# Patient Record
Sex: FEMALE | URBAN - METROPOLITAN AREA
[De-identification: names, ages, dates, MRNs, and addresses within clinical notes are randomized per-mention and may not be internally consistent; named-entity substitution may affect disease eponyms.]

---

## 2023-08-03 ENCOUNTER — APPOINTMENT (OUTPATIENT)
Dept: URGENT CARE | Age: 53
End: 2023-08-03

## 2024-12-22 ENCOUNTER — APPOINTMENT (OUTPATIENT)
Dept: CT IMAGING | Facility: HOSPITAL | Age: 54
End: 2024-12-22
Attending: EMERGENCY MEDICINE

## 2024-12-22 ENCOUNTER — HOSPITAL ENCOUNTER (EMERGENCY)
Facility: HOSPITAL | Age: 54
Discharge: HOME | End: 2024-12-22
Attending: EMERGENCY MEDICINE

## 2024-12-22 VITALS
SYSTOLIC BLOOD PRESSURE: 143 MMHG | WEIGHT: 208 LBS | TEMPERATURE: 99 F | OXYGEN SATURATION: 94 % | HEART RATE: 97 BPM | BODY MASS INDEX: 33.43 KG/M2 | HEIGHT: 66 IN | DIASTOLIC BLOOD PRESSURE: 98 MMHG | RESPIRATION RATE: 20 BRPM

## 2024-12-22 DIAGNOSIS — R03.0 ELEVATED BLOOD PRESSURE READING: ICD-10-CM

## 2024-12-22 DIAGNOSIS — K04.7 DENTAL ABSCESS: ICD-10-CM

## 2024-12-22 DIAGNOSIS — L03.211 CELLULITIS OF FACE: Primary | ICD-10-CM

## 2024-12-22 LAB
ANION GAP SERPL CALC-SCNC: 7 MMOL/L (ref 0–18)
BASOPHILS # BLD AUTO: 0.03 X10(3) UL (ref 0–0.2)
BASOPHILS NFR BLD AUTO: 0.2 %
BUN BLD-MCNC: 8 MG/DL (ref 9–23)
CALCIUM BLD-MCNC: 10.3 MG/DL (ref 8.7–10.4)
CHLORIDE SERPL-SCNC: 109 MMOL/L (ref 98–112)
CO2 SERPL-SCNC: 24 MMOL/L (ref 21–32)
CREAT BLD-MCNC: 0.64 MG/DL
DEPRECATED RDW RBC AUTO: 40.3 FL (ref 35.1–46.3)
EGFRCR SERPLBLD CKD-EPI 2021: 106 ML/MIN/1.73M2 (ref 60–?)
EOSINOPHIL # BLD AUTO: 0.14 X10(3) UL (ref 0–0.7)
EOSINOPHIL NFR BLD AUTO: 0.9 %
ERYTHROCYTE [DISTWIDTH] IN BLOOD BY AUTOMATED COUNT: 13.1 % (ref 11–15)
GLUCOSE BLD-MCNC: 120 MG/DL (ref 70–99)
HCT VFR BLD AUTO: 38.5 %
HGB BLD-MCNC: 13.3 G/DL
IMM GRANULOCYTES # BLD AUTO: 0.05 X10(3) UL (ref 0–1)
IMM GRANULOCYTES NFR BLD: 0.3 %
LACTATE SERPL-SCNC: 0.8 MMOL/L (ref 0.5–2)
LYMPHOCYTES # BLD AUTO: 2.59 X10(3) UL (ref 1–4)
LYMPHOCYTES NFR BLD AUTO: 16.9 %
MCH RBC QN AUTO: 29.8 PG (ref 26–34)
MCHC RBC AUTO-ENTMCNC: 34.5 G/DL (ref 31–37)
MCV RBC AUTO: 86.1 FL
MONOCYTES # BLD AUTO: 0.76 X10(3) UL (ref 0.1–1)
MONOCYTES NFR BLD AUTO: 5 %
NEUTROPHILS # BLD AUTO: 11.76 X10 (3) UL (ref 1.5–7.7)
NEUTROPHILS # BLD AUTO: 11.76 X10(3) UL (ref 1.5–7.7)
NEUTROPHILS NFR BLD AUTO: 76.7 %
PLATELET # BLD AUTO: 251 10(3)UL (ref 150–450)
POTASSIUM SERPL-SCNC: 3.5 MMOL/L (ref 3.5–5.1)
POTASSIUM SERPL-SCNC: 3.5 MMOL/L (ref 3.5–5.1)
RBC # BLD AUTO: 4.47 X10(6)UL
SODIUM SERPL-SCNC: 140 MMOL/L (ref 136–145)
WBC # BLD AUTO: 15.3 X10(3) UL (ref 4–11)

## 2024-12-22 PROCEDURE — 70491 CT SOFT TISSUE NECK W/DYE: CPT | Performed by: EMERGENCY MEDICINE

## 2024-12-22 PROCEDURE — 96375 TX/PRO/DX INJ NEW DRUG ADDON: CPT

## 2024-12-22 PROCEDURE — 87040 BLOOD CULTURE FOR BACTERIA: CPT | Performed by: EMERGENCY MEDICINE

## 2024-12-22 PROCEDURE — 80048 BASIC METABOLIC PNL TOTAL CA: CPT | Performed by: EMERGENCY MEDICINE

## 2024-12-22 PROCEDURE — 99284 EMERGENCY DEPT VISIT MOD MDM: CPT

## 2024-12-22 PROCEDURE — 36415 COLL VENOUS BLD VENIPUNCTURE: CPT

## 2024-12-22 PROCEDURE — 85025 COMPLETE CBC W/AUTO DIFF WBC: CPT | Performed by: EMERGENCY MEDICINE

## 2024-12-22 PROCEDURE — 84132 ASSAY OF SERUM POTASSIUM: CPT | Performed by: EMERGENCY MEDICINE

## 2024-12-22 PROCEDURE — 83605 ASSAY OF LACTIC ACID: CPT | Performed by: EMERGENCY MEDICINE

## 2024-12-22 PROCEDURE — 96366 THER/PROPH/DIAG IV INF ADDON: CPT

## 2024-12-22 PROCEDURE — 96365 THER/PROPH/DIAG IV INF INIT: CPT

## 2024-12-22 RX ORDER — KETOROLAC TROMETHAMINE 15 MG/ML
15 INJECTION, SOLUTION INTRAMUSCULAR; INTRAVENOUS ONCE
Status: COMPLETED | OUTPATIENT
Start: 2024-12-22 | End: 2024-12-22

## 2024-12-22 RX ORDER — DEXAMETHASONE SODIUM PHOSPHATE 10 MG/ML
10 INJECTION, SOLUTION INTRAMUSCULAR; INTRAVENOUS ONCE
Status: COMPLETED | OUTPATIENT
Start: 2024-12-22 | End: 2024-12-22

## 2024-12-22 NOTE — ED INITIAL ASSESSMENT (HPI)
Patient went to IC and was told to come here d/t abscess in the mouth. She states the the swelling started this morning. IC called and stated they were concern for possible Osteo in the jaw, Large tennis ball sized abscess note to the left side of her mouth. +Swelling, redness.

## 2024-12-22 NOTE — ED PROVIDER NOTES
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  Patient states that she lost a crown in her left lower jaw earlier this week and since this morning she noticed swelling to her left lower jaw that has since markedly increased over the past few hours.  She was seen in urgent care earlier and was started amoxicillin of which she is taking 1 dose however was advised to come to the ER if progressively worsening.  She denies shortness of breath but feels like \"there is a frog in my throat\".  No fevers.  No ear pain.  No vomiting.  Has otherwise been healthy recently without cough or other recent illness.    Review of Systems:  Review of Systems  Positive for stated complaint: Large Abscess to Mandible/poor dentation, possible cellulits to face/neck Osteo*. Constitutional and vital signs reviewed. All other systems reviewed and negative except as noted above.    Patient History:  Past Medical History:    Pulmonary embolism (HCC)       Past Surgical History:   Procedure Laterality Date    Total abdom hysterectomy          No family history on file.    Specific Social Determinants of Health:   Social History     Socioeconomic History    Marital status: Single   Tobacco Use    Smoking status: Every Day     Current packs/day: 0.50     Types: Cigarettes    Smokeless tobacco: Never   Vaping Use    Vaping status: Never Used   Substance and Sexual Activity    Alcohol use: Not Currently    Drug use: Never           PSFH elements reviewed from today and agreed except as otherwise stated in HPI.    Physical  Exam     ED Triage Vitals [12/22/24 1642]   BP (!) 147/92   Pulse 108   Resp 20   Temp 98.9 °F (37.2 °C)   Temp src Temporal   SpO2 97 %   O2 Device None (Room air)       Current:BP (!) 143/98   Pulse 97   Temp 98.9 °F (37.2 °C) (Temporal)   Resp 20   Ht 167.6 cm (5' 6\")   Wt 94.3 kg   SpO2 94%   BMI 33.57 kg/m²         Physical Exam  Vitals and nursing note reviewed.   Constitutional:       General: She is not in acute distress.     Appearance: She is not ill-appearing.   HENT:      Head: Normocephalic and atraumatic.        Right Ear: External ear normal.      Left Ear: External ear normal.      Nose: Nose normal.      Mouth/Throat:      Mouth: Mucous membranes are moist.      Pharynx: Oropharynx is clear. Uvula midline. No oropharyngeal exudate, posterior oropharyngeal erythema or uvula swelling.      Tonsils: No tonsillar abscesses.     Eyes:      Extraocular Movements: Extraocular movements intact.      Conjunctiva/sclera: Conjunctivae normal.      Pupils: Pupils are equal, round, and reactive to light.   Cardiovascular:      Rate and Rhythm: Regular rhythm. Tachycardia present.      Heart sounds: No murmur heard.  Pulmonary:      Effort: Pulmonary effort is normal. No respiratory distress.      Breath sounds: No wheezing or rales.   Abdominal:      General: There is no distension.      Palpations: Abdomen is soft.      Tenderness: There is no abdominal tenderness. There is no guarding or rebound.   Musculoskeletal:         General: No deformity.      Cervical back: Neck supple. No rigidity.   Skin:     General: Skin is warm and dry.      Capillary Refill: Capillary refill takes less than 2 seconds.   Neurological:      General: No focal deficit present.      Mental Status: She is alert.      Cranial Nerves: No cranial nerve deficit.         ED Course   Labs:   Labs Reviewed   CBC WITH DIFFERENTIAL WITH PLATELET - Abnormal; Notable for the following components:       Result Value    WBC 15.3 (*)      Neutrophil Absolute Prelim 11.76 (*)     Neutrophil Absolute 11.76 (*)     All other components within normal limits   BASIC METABOLIC PANEL (8) - Abnormal; Notable for the following components:    Glucose 120 (*)     All other components within normal limits   REDRAW BMP - Abnormal; Notable for the following components:    BUN 8 (*)     All other components within normal limits   LACTIC ACID, PLASMA - Normal   POTASSIUM - Normal   BLOOD CULTURE   BLOOD CULTURE     Radiology findings:    CT SOFT TISSUE OF NECK(CONTRAST ONLY) (CPT=70491)    Result Date: 12/22/2024  CONCLUSION:  1. Edema and trace fluid in the left Haley mandibular soft tissues with infiltration of the subcutaneous fat along the left side of the mandible and extending into the infra mandibular region.  No focal fluid collection/abscess. 2. Multiple periodontal dental abscesses, dental caries, and chipped teeth. 3. Emphysema.     Dictated by (CST): Patel Choi MD on 12/22/2024 at 7:39 PM     Finalized by (CST): Patel Choi MD on 12/22/2024 at 7:45 PM             MDM   This patient presents with swelling to left jaw after recently lost a crown earlier this week, tachycardic though afebrile on arrival with obvious swelling to the left lower mandible and necrotic tooth in the left lower molar. Exam as above. Pt without trismus or drooling but does have jaw swelling with some erythema. Tachycardic on arrival.     Differential diagnoses considered includes, but is not limited to:   Dental abscess  Jaw abscess  Osteomyelitis of jaw  Facial cellulitis  Luis angina   Sepsis     Will obtain the following tests: cbc, bmp, bcx, lactic, CT neck including mandible   Will administer the following medications/therapies: IV NS bolus, toradol, dexamethasone, IV unasyn     Please see ED course for my independent review of these tests/imaging results.      ED Course as of 12/23/24 0217  ------------------------------------------------------------  Time: 12/22  1851  Value: WBC(!): 15.3  Comment: Cbc leukocytosis with left shift. Lactic normal. Bmp partially hemolyzed but Cr normal   ------------------------------------------------------------  Time: 12/22 1947  Value: CT SOFT TISSUE OF NECK(CONTRAST ONLY) (CPT=70491)  Comment: FINDINGS:  UPPER AERODIGESTIVE TRACT: Nasopharynx, and oropharynx are normal.  No oral cavity mass or calculus.  Epiglottis, supraglottic larynx, larynx and subglottic airway are normal.  No suspect mass in the upper aerodigestive tract.  PAROTID GLANDS:   Normal.    SUBMANDIBULAR GLANDS:   Normal.    THYROID GLAND:   Normal.    LYMPH NODES:   Increased number of top-normal sized lymph nodes in the submandibular region.  No lymph node meeting size criteria for enlargement.  No centrally necrotic lymph node.    SINONASAL:   No evidence for sinusitis. No nasal passage mass.  MASTOIDS: No evidence for mastoiditis.  ORBITS: Limited views normal.  BRAIN: No suspect mass or enhancement in visualized portion of brain.  VASCULAR: Normal.    BONES: No suspect bone lesion or fracture.  APICES/MEDIASTINUM:   Bi apical paraseptal emphysema.     OTHER: Left Haley mandibular soft tissue swelling with trace fluid and infiltration of the subcutaneous fat extending down to the level of the thyroid cartilage.  No focal fluid collection.  Multiple periodontal dental abscesses along the mandible  bilaterally with some chipped teeth, and coexistent dental caries.              Impression  CONCLUSION:  1. Edema and trace fluid in the left Haley mandibular soft tissues with infiltration of the subcutaneous fat along the left side of the mandible and extending into the infra mandibular region.  No focal fluid collection/abscess.  2. Multiple periodontal dental abscesses, dental caries, and chipped teeth.  3. Emphysema.         ------------------------------------------------------------  Time: 12/22 2044  Value: Potassium: 3.5  Comment: Patient reassessed updated with  results.  On exam her swelling has not worsened and if anything she feels it is getting better.  I had discussed her case with Dr. Solitario who is happy to follow up with her in his clinic tomorrow.  I advised patient to call to schedule follow-up and continue Augmentin starting tomorrow at home.  Motrin for pain.  Return if new or worsening symptoms or rapidly expanding swelling, shortness of breath, stridor, etc. She is comfortable with the discharge plan at this time.            Procedures:  Procedures        Disposition and Plan     Clinical Impression:  1. Cellulitis of face    2. Dental abscess    3. Elevated blood pressure reading        Disposition:  Discharge    Follow-up:  Andrae Solitario, ISSACS  [address]  [address]  [facility] [address] [phone]    Schedule an appointment as soon as possible for a visit in 1 day(s)  call in AM to get in within 24 hours    [facility] Emergency Department  [address]  [facility] [address] [phone]  Go to  If symptoms worsen, immediately      Medications Prescribed:  There are no discharge medications for this patient.        This note may have been created using voice dictation technology and may include inadvertent errors.      Sonia Milian, DO  Attending Physician   Emergency Medicine

## 2024-12-22 NOTE — ED QUICK NOTES
Pt states she had a filling fall out on Tuesday. Swelling and redness seen on the left side of the face. She denies kiya or trouble swallowing.

## 2024-12-23 NOTE — ED QUICK NOTES
Patient arrived to ED with main c.o. abscess in mouth on left side of mouth (golf ball sized) - patient confirms swelling started this morning - patient seen in IC - patient has swelling - denies fevers and chills - patient breathing nonlabored on RA + pt hemodynamically stable - denies trouble swallowing.

## 2024-12-23 NOTE — DISCHARGE INSTRUCTIONS
Thank you for seeking care at [facility] Emergency Department.  You have been seen and evaluated.  The CT did not show a soft tissue abscess.  It showed some dental abscesses that are not extending into your throat or airway that are near your infected teeth.  Please call the dental office tomorrow, let them know you spoke with Dr. Solitario and you will be seen within 24 hours.  They will get you into the office.  Please take the Augmentin as prescribed.  You received an IV dose in the ER and should start this tomorrow.  Use Motrin and Tylenol as needed for pain.   We discussed the results of your workup   Please read the instructions provided   If given prescriptions, take as instructed    Remember, your care process does not end after your visit today. Please follow-up with your dentist and doctor within 1-2 days for a follow-up check to ensure you are  improving, to see if you need any further evaluation/testing, or to evaluate for any alternate diagnoses.     Your blood pressure was noted to be elevated in the ER today. Please follow up with your primary doctor within the next 2-3 months for a reevaluation. Uncontrolled high blood pressure can lead to strokes, heart attacks, and kidney failure.     Please return to the emergency department if you develop fever, neck pain, painful jaw opening, inability to open or close your jaw, redness on your chin or neck, fullness in your neck or if you develop any other new or concerning symptoms as these could be signs of more serious medical illness.